# Patient Record
(demographics unavailable — no encounter records)

---

## 2025-04-11 NOTE — PHYSICAL EXAM
[MA] : MA [FreeTextEntry2] : richa [Appropriately responsive] : appropriately responsive [Alert] : alert [No Acute Distress] : no acute distress [No Lymphadenopathy] : no lymphadenopathy [Regular Rate Rhythm] : regular rate rhythm [No Murmurs] : no murmurs [Clear to Auscultation B/L] : clear to auscultation bilaterally [Soft] : soft [Non-tender] : non-tender [Non-distended] : non-distended [No Lesions] : no lesions [No Mass] : no mass [Oriented x3] : oriented x3 [Examination Of The Breasts] : a normal appearance [Breast Palpation Diffuse Fibrous Tissue Bilateral] : fibrocystic changes [No Masses] : no breast masses were palpable [Labia Majora] : normal [Labia Minora] : normal [Normal] : normal [Uterine Adnexae] : normal

## 2025-04-11 NOTE — HISTORY OF PRESENT ILLNESS
[N] : Patient is not sexually active [Never active] : never active [Normal Amount/Duration] :  normal amount and duration [Regular Cycle Intervals] : periods have been regular [No] : never pregnant [FreeTextEntry1] : presents for a well women visit and gyn exam.

## 2025-04-11 NOTE — REASON FOR VISIT

## 2025-04-11 NOTE — PLAN
[FreeTextEntry1] : annual: pap and gcct  not s/a intercourse but other reg periods getting br lift in [place] soon sono br ordered